# Patient Record
Sex: FEMALE | Race: BLACK OR AFRICAN AMERICAN | NOT HISPANIC OR LATINO | URBAN - METROPOLITAN AREA
[De-identification: names, ages, dates, MRNs, and addresses within clinical notes are randomized per-mention and may not be internally consistent; named-entity substitution may affect disease eponyms.]

---

## 2018-01-27 ENCOUNTER — EMERGENCY (EMERGENCY)
Facility: HOSPITAL | Age: 43
LOS: 1 days | Discharge: HOME | End: 2018-01-27
Attending: EMERGENCY MEDICINE
Payer: MEDICAID

## 2018-01-27 VITALS
HEART RATE: 82 BPM | OXYGEN SATURATION: 99 % | DIASTOLIC BLOOD PRESSURE: 68 MMHG | TEMPERATURE: 98 F | RESPIRATION RATE: 16 BRPM | SYSTOLIC BLOOD PRESSURE: 130 MMHG

## 2018-01-27 VITALS
SYSTOLIC BLOOD PRESSURE: 155 MMHG | WEIGHT: 136.03 LBS | RESPIRATION RATE: 17 BRPM | HEART RATE: 88 BPM | TEMPERATURE: 98 F | OXYGEN SATURATION: 98 % | HEIGHT: 64 IN | DIASTOLIC BLOOD PRESSURE: 87 MMHG

## 2018-01-27 LAB
CK MB BLD-MCNC: <1.1 % — SIGNIFICANT CHANGE UP (ref 0–3.5)
CK MB CFR SERPL CALC: <1 NG/ML — SIGNIFICANT CHANGE UP (ref 0–3.6)
CK SERPL-CCNC: 87 U/L — SIGNIFICANT CHANGE UP (ref 21–215)
HCG UR QL: NEGATIVE — SIGNIFICANT CHANGE UP
TROPONIN I SERPL-MCNC: <0.015 NG/ML — SIGNIFICANT CHANGE UP (ref 0–0.04)

## 2018-01-27 PROCEDURE — 71046 X-RAY EXAM CHEST 2 VIEWS: CPT | Mod: 26

## 2018-01-27 PROCEDURE — 99285 EMERGENCY DEPT VISIT HI MDM: CPT

## 2018-01-27 PROCEDURE — 93005 ELECTROCARDIOGRAM TRACING: CPT

## 2018-01-27 PROCEDURE — 82553 CREATINE MB FRACTION: CPT

## 2018-01-27 PROCEDURE — 99283 EMERGENCY DEPT VISIT LOW MDM: CPT | Mod: 25

## 2018-01-27 PROCEDURE — 71046 X-RAY EXAM CHEST 2 VIEWS: CPT

## 2018-01-27 PROCEDURE — 81025 URINE PREGNANCY TEST: CPT

## 2018-01-27 PROCEDURE — 82550 ASSAY OF CK (CPK): CPT

## 2018-01-27 PROCEDURE — 84484 ASSAY OF TROPONIN QUANT: CPT

## 2018-01-27 PROCEDURE — 93010 ELECTROCARDIOGRAM REPORT: CPT

## 2018-01-27 NOTE — ED PROVIDER NOTE - ATTENDING CONTRIBUTION TO CARE
I performed the initial face to face bedside interview with this patient regarding history of present illness, review of symptoms and past medical, social and family history.  I completed an independent physical examination.  I was the initial provider who evaluated this patient.  The history, review of symptoms and examination was documented by the scribe in my presence and I attest to the accuracy of the documentation.  I have signed out the follow up of any pending tests (i.e. labs, radiological studies) to the NP I have discussed the patient’s plan of care and disposition with the NP  .

## 2018-01-27 NOTE — ED PROVIDER NOTE - OBJECTIVE STATEMENT
43 y/o F pt with PMHx of Choledocholithiasis (s/p cholecystectomy) and PSHx of Cholecystectomy and Ovarian Cystectomy presents to ED c/o chest pain with associated shortness of breath for several days. Pt also reports x2-3 episodes of hot flashes, cold sweats, and nausea that accompanied pt's chest pain. Pt describes chest pain as pressure-like. Pt states having recent stressors with work, recent lingering divorce, and recent passing of her father. Pt denies cough, or any other complaints. Pt also denies cardiac risk factors, taking any medications on a daily basis, Hx of smoking, or currently being on birth control pills. NKDA.

## 2018-01-27 NOTE — ED PROVIDER NOTE - MEDICAL DECISION MAKING DETAILS
41 y/o F pt presents with chest pain. Plan for cardiac enzymes, CXR, labs, and reassess. Pt is PERC negative.

## 2018-01-27 NOTE — ED PROVIDER NOTE - PROGRESS NOTE DETAILS
ECG NSR. CXR NAD. Trop negative. HEART score 1. Positive family history of cardiac disease. Will refer to cardiology. Pt is well appearing walking with steady gait, stable for discharge and follow up without fail with medical doctor. I had a detailed discussion with the patient and/or guardian regarding the historical points, exam findings, and any diagnostic results supporting the discharge diagnosis. Pt educated on care and need for follow up. Strict return instructions and red flag signs and symptoms discussed with patient. Questions answered. Pt shows understanding of discharge information and agrees to follow.

## 2022-12-08 NOTE — ED PROVIDER NOTE - PRINCIPAL DIAGNOSIS
Hi Dr Salcedo, I saw this pt for a LOT visit today. She'd rather continue to follow up with you and Dr Haro at your other office. She's asking about a suppository form of aspirin for VTE prevention which apparently exists. Is this reasonable? Also, I'm not sure if you wanted other hypercoag labs now that she's off OAC but I'm happy to order. Even though she stopped Xarelto, her n/v & poor appetite don't seem to be getting better. Let me know what you think. Thanks, Nevin
Chest pain

## 2023-01-18 NOTE — ED PROVIDER NOTE - TOBACCO USE
[place]: Xr w/ fx at base of 5th metacarpal - pseudojones fx. There were no medium cam walkers in ED  - d/w charge RN - unable to find any in hospital. Dad states that they plan on going to [facility] tomorrow morning. Placed in hard sole shoe. Pt came to ED w/ crutches - I demonstrated proper use, pt then used them appropriately.   Discussed importance of outpt follow up and return precautions. Clinically no indication for further emergent ED workup or hospitalization at this time. Stable for dc, outpt f/u. Never smoker